# Patient Record
Sex: FEMALE | Race: ASIAN | Employment: FULL TIME | URBAN - METROPOLITAN AREA
[De-identification: names, ages, dates, MRNs, and addresses within clinical notes are randomized per-mention and may not be internally consistent; named-entity substitution may affect disease eponyms.]

---

## 2021-06-29 ENCOUNTER — OFFICE VISIT (OUTPATIENT)
Dept: RHEUMATOLOGY | Facility: CLINIC | Age: 61
End: 2021-06-29
Payer: COMMERCIAL

## 2021-06-29 VITALS
WEIGHT: 146 LBS | DIASTOLIC BLOOD PRESSURE: 90 MMHG | HEIGHT: 61 IN | TEMPERATURE: 99 F | HEART RATE: 80 BPM | SYSTOLIC BLOOD PRESSURE: 135 MMHG | BODY MASS INDEX: 27.56 KG/M2

## 2021-06-29 DIAGNOSIS — M17.11 OSTEOARTHRITIS OF RIGHT KNEE, UNSPECIFIED OSTEOARTHRITIS TYPE: ICD-10-CM

## 2021-06-29 DIAGNOSIS — M53.3 SACROILIAC DYSFUNCTION: ICD-10-CM

## 2021-06-29 DIAGNOSIS — M41.9 SCOLIOSIS, UNSPECIFIED SCOLIOSIS TYPE, UNSPECIFIED SPINAL REGION: ICD-10-CM

## 2021-06-29 DIAGNOSIS — M19.041 PRIMARY OSTEOARTHRITIS OF BOTH HANDS: Primary | ICD-10-CM

## 2021-06-29 DIAGNOSIS — M19.042 PRIMARY OSTEOARTHRITIS OF BOTH HANDS: Primary | ICD-10-CM

## 2021-06-29 DIAGNOSIS — M47.816 OSTEOARTHRITIS OF LUMBAR SPINE, UNSPECIFIED SPINAL OSTEOARTHRITIS COMPLICATION STATUS: ICD-10-CM

## 2021-06-29 DIAGNOSIS — Z79.899 HIGH RISK MEDICATION USE: ICD-10-CM

## 2021-06-29 DIAGNOSIS — M19.90 INFLAMMATORY ARTHRITIS: ICD-10-CM

## 2021-06-29 PROBLEM — M15.9 GENERALIZED OSTEOARTHRITIS: Status: ACTIVE | Noted: 2020-07-12

## 2021-06-29 PROBLEM — I10 BENIGN ESSENTIAL HYPERTENSION: Status: ACTIVE | Noted: 2020-06-25

## 2021-06-29 PROCEDURE — 3008F BODY MASS INDEX DOCD: CPT | Performed by: INTERNAL MEDICINE

## 2021-06-29 PROCEDURE — 3080F DIAST BP >= 90 MM HG: CPT | Performed by: INTERNAL MEDICINE

## 2021-06-29 PROCEDURE — 3075F SYST BP GE 130 - 139MM HG: CPT | Performed by: INTERNAL MEDICINE

## 2021-06-29 PROCEDURE — 99244 OFF/OP CNSLTJ NEW/EST MOD 40: CPT | Performed by: INTERNAL MEDICINE

## 2021-06-29 RX ORDER — DICLOFENAC SODIUM 75 MG/1
TABLET, DELAYED RELEASE ORAL
COMMUNITY

## 2021-06-29 RX ORDER — AMLODIPINE BESYLATE 5 MG/1
5 TABLET ORAL DAILY
COMMUNITY
Start: 2021-02-02

## 2021-06-29 RX ORDER — ERGOCALCIFEROL (VITAMIN D2) 1250 MCG
CAPSULE ORAL
COMMUNITY
Start: 2020-07-09

## 2021-06-29 NOTE — PATIENT INSTRUCTIONS
To schedule physical therapy appointment   [facility]  [phone]    -Get bloodwork. Purchase Voltaren (diclofenac) 1% gel over-the-counter. It is in an orange packaging. Total dose should not exceed 32 g per day, overall affected joints.

## 2021-06-29 NOTE — PROGRESS NOTES
Rheumatology New Patient Note  =====================================================================================================      Date of visit: 6/29/2021  ? Patient presents with:  Establish Care: New pt, referred by PCP for joint paint.  RT h History:  Family History   Problem Relation Age of Onset   • Other (Other) Mother         RA vs OA     Social History:  Social History    Tobacco Use      Smoking status: Never Smoker      Smokeless tobacco: Never Used    Alcohol use: Not Currently    Drug found for: ANATI, ANA MARÍA, ANAS, ANASCRN, ANASCRNRFLX, KATHIE  No results found for: SSA, SSAUR, ANTISSA, SSA52, SSA60, SSADD, SSB, ANTISSB  No results found for: DSDNA, ANTIDSDNA, SMUD, ANTISM, SM, RNP, ANTIRNP, SMITHRNP  No results found for: SCL70, SCL, ANTIS osteitis condensens illi. MRI imaging and x-ray imaging of the LS spine is consistent with lumbar DJD. Patient with a history of scoliosis. Hand arthralgias are consistent with hand osteoarthritis, with an erosive component.   Right MCP 2 narrowing, RE[facility]    Osteoarthritis of right knee, unspecified osteoarthritis type  -     OP REFERRAL TO [facility]    Sacroiliac dysfunction  -     XR SACROILIAC JOINTS (MIN 3 VIEWS) (CPT=72202);  Future        Return in about 4 months (around 10/

## 2021-06-30 ENCOUNTER — TELEPHONE (OUTPATIENT)
Dept: PHYSICAL THERAPY | Facility: HOSPITAL | Age: 61
End: 2021-06-30

## 2021-07-19 ENCOUNTER — APPOINTMENT (OUTPATIENT)
Dept: PHYSICAL THERAPY | Age: 61
End: 2021-07-19
Attending: INTERNAL MEDICINE
Payer: COMMERCIAL

## 2021-07-21 ENCOUNTER — APPOINTMENT (OUTPATIENT)
Dept: PHYSICAL THERAPY | Age: 61
End: 2021-07-21
Attending: INTERNAL MEDICINE
Payer: COMMERCIAL

## 2021-07-27 ENCOUNTER — APPOINTMENT (OUTPATIENT)
Dept: PHYSICAL THERAPY | Age: 61
End: 2021-07-27
Attending: INTERNAL MEDICINE
Payer: COMMERCIAL

## 2021-07-29 ENCOUNTER — APPOINTMENT (OUTPATIENT)
Dept: PHYSICAL THERAPY | Age: 61
End: 2021-07-29
Attending: INTERNAL MEDICINE
Payer: COMMERCIAL

## 2021-08-02 ENCOUNTER — APPOINTMENT (OUTPATIENT)
Dept: PHYSICAL THERAPY | Age: 61
End: 2021-08-02
Attending: INTERNAL MEDICINE
Payer: COMMERCIAL

## 2021-08-04 ENCOUNTER — APPOINTMENT (OUTPATIENT)
Dept: PHYSICAL THERAPY | Age: 61
End: 2021-08-04
Attending: INTERNAL MEDICINE
Payer: COMMERCIAL

## 2021-08-09 ENCOUNTER — APPOINTMENT (OUTPATIENT)
Dept: PHYSICAL THERAPY | Age: 61
End: 2021-08-09
Attending: INTERNAL MEDICINE
Payer: COMMERCIAL

## 2021-08-11 ENCOUNTER — APPOINTMENT (OUTPATIENT)
Dept: PHYSICAL THERAPY | Age: 61
End: 2021-08-11
Attending: INTERNAL MEDICINE
Payer: COMMERCIAL

## 2021-10-10 ENCOUNTER — TELEPHONE (OUTPATIENT)
Dept: RHEUMATOLOGY | Facility: CLINIC | Age: 61
End: 2021-10-10

## 2021-10-10 NOTE — TELEPHONE ENCOUNTER
Please call patient. Did she drink any alcohol prior to getting blood work? ALT slightly elevated. No need to obtain repeat blood work at this time until we see CCP is back. Disregard previous message, labs were found in 6 different faxes. Tried calling patient, went to voicemail.      Received labs collected October 7, 2021    BBC 6.7, hemoglobin 13.7, platelets 400, differential  Sed rate 17  Total cholesterol 266, triglycerides 178, HDL 54, , VLDL 36  Hemoglobin A1c 5.0    CRP 0.9 mg/dL  Creatinine 0.9,   AST 48, , alk phos 55, T bili 0.5  TSH 1.544  Free T4 1.01    RF 15.4 (none less than 14.0 is normal.  CCP pending    Vitamin D 31.2  Vitamin B12 576

## 2021-10-11 ENCOUNTER — TELEPHONE (OUTPATIENT)
Dept: RHEUMATOLOGY | Facility: CLINIC | Age: 61
End: 2021-10-11

## 2021-10-11 DIAGNOSIS — R74.01 ELEVATED ALT MEASUREMENT: Primary | ICD-10-CM

## 2021-10-11 NOTE — TELEPHONE ENCOUNTER
Received labs collected October 7, 2021     BBC 6.7, hemoglobin 13.7, platelets 982, differential  Sed rate 17  Total cholesterol 266, triglycerides 178, HDL 54, , VLDL 36  Hemoglobin A1c 5.0     CRP 0.9 mg/dL  Creatinine 0.9,   AST 48, , a

## 2021-10-12 ENCOUNTER — TELEPHONE (OUTPATIENT)
Dept: RHEUMATOLOGY | Facility: CLINIC | Age: 61
End: 2021-10-12

## 2021-10-12 NOTE — TELEPHONE ENCOUNTER
Pt called back for her test results. She is off work today so can call at anytime. Pt aware that Dr. Anna Emerson is in clinic and will call at his earliest convenience.

## 2021-10-13 NOTE — TELEPHONE ENCOUNTER
Please call patient. Did she drink any alcohol prior to getting blood work? ALT slightly elevated.     Repeat hepatic panel 1 week prior to next appointment. Can fax to patient's lab of choice. Tried calling patient earlier, went to voicemail.      Rec

## 2021-10-21 NOTE — TELEPHONE ENCOUNTER
Pt returned our call; reviewed notes per Dr Rayne Alvarez with pt who verbalized understanding and agreed to this plan. Pt will have labs drawn today. Confirmed appt for Monday 10/25/21 4pm and location.

## 2021-10-25 ENCOUNTER — OFFICE VISIT (OUTPATIENT)
Dept: RHEUMATOLOGY | Facility: CLINIC | Age: 61
End: 2021-10-25
Payer: COMMERCIAL

## 2021-10-25 VITALS
HEART RATE: 93 BPM | BODY MASS INDEX: 27.38 KG/M2 | HEIGHT: 61 IN | DIASTOLIC BLOOD PRESSURE: 86 MMHG | SYSTOLIC BLOOD PRESSURE: 123 MMHG | WEIGHT: 145 LBS | OXYGEN SATURATION: 99 %

## 2021-10-25 DIAGNOSIS — M15.4 EROSIVE OSTEOARTHRITIS OF HAND: Primary | ICD-10-CM

## 2021-10-25 DIAGNOSIS — R74.01 ALT (SGPT) LEVEL RAISED: ICD-10-CM

## 2021-10-25 DIAGNOSIS — Z51.81 THERAPEUTIC DRUG MONITORING: ICD-10-CM

## 2021-10-25 PROCEDURE — 99214 OFFICE O/P EST MOD 30 MIN: CPT | Performed by: INTERNAL MEDICINE

## 2021-10-25 PROCEDURE — 3008F BODY MASS INDEX DOCD: CPT | Performed by: INTERNAL MEDICINE

## 2021-10-25 PROCEDURE — 3074F SYST BP LT 130 MM HG: CPT | Performed by: INTERNAL MEDICINE

## 2021-10-25 PROCEDURE — 3079F DIAST BP 80-89 MM HG: CPT | Performed by: INTERNAL MEDICINE

## 2021-10-25 RX ORDER — MELOXICAM 15 MG/1
15 TABLET ORAL DAILY
Qty: 30 TABLET | Refills: 0 | Status: SHIPPED | OUTPATIENT
Start: 2021-10-25

## 2021-10-25 NOTE — PROGRESS NOTES
Rheumatology f/u Patient Note  =====================================================================================================    Today's Visit: 10/25/21  ? Patient presents with: Follow - Up: 4mo f/u, fingers have gotten worse on both hands.  H as needed. , Disp: , Rfl:   ergocalciferol 1.25 MG (05244 UT) Oral Cap, ergocalciferol (vitamin D2) 1,250 mcg (50,000 unit) capsule   TAKE ONE CAPSULE BY MOUTH ONCE A WEEK, Disp: , Rfl:   Multiple Vitamin (MULTI-VITAMIN DAILY OR), Multi Vitamin, Disp: , Rfl BUNCREA, CREATSERUM, ANIONGAP, GFR, GFRNAA, GFRAA, CA, OSMOCALC, ALKPHO, AST, ALT, ALKPHOS, BILT, TP, ALB, GLOBULT, GLOBULIN, AGRATIO, ALBGLOBRAT, NA, K, CL, CO2      No results found for: ANATI, ANA MARÍA, ANAS, ANASCRN, ANASCRNRFLX, KATHIE  No results found for: protrusion    =====================================================================================================  Assessment and Plan    Assessment:  Erosive osteoarthritis of hand  (primary encounter diagnosis)  ALT (SGPT) level raised  Therapeutic bautista total) by mouth daily.  -     OP REFERRAL TO [facility]    ALT (SGPT) level raised  -     COMP METABOLIC PANEL (14); Future    Therapeutic drug monitoring        No follow-ups on file.       The above plan of care, diagnosis, orders, and fo

## 2021-10-25 NOTE — PATIENT INSTRUCTIONS
Stop diclofenac, start meloxicam 15 mg daily with food for 1 week, if meloxicam is better, continue that. If not, stop meloxicam and go back to diclofenac. Turmeric for arthritis: take 1000 mg twice daily.

## 2021-11-09 ENCOUNTER — TELEPHONE (OUTPATIENT)
Dept: RHEUMATOLOGY | Facility: CLINIC | Age: 61
End: 2021-11-09

## 2021-11-09 NOTE — TELEPHONE ENCOUNTER
Can you see how patient's hand and knee OA is doing with mobic? Also, did pennsaid sample help? If it did, can put in formal rx.

## 2021-11-09 NOTE — TELEPHONE ENCOUNTER
Called pt, pt condition improved since starting the meloxicam. Pain decreased, has not had to use pennsaid sample. Denies refill at this time. Will call our office with questions or concerns.

## (undated) NOTE — LETTER
10/25/2021        Referring:  No referring provider defined for this encounter. Dear Dr. Jun Tang:    Thank you for referring your patient to me for an evaluation. Please see my attached note for my findings and recommendations.  Should you have any questi finger. No gout flares, no kidney stones. Takes turmeric 400 mg BID   Back is better.      5 point ROS negative except noted above    Medications:  Misc Natural Products (APPLE CIDER VINEGAR DIET OR), apple cider vinegar, Disp: , Rfl:   Turmeric (QC COSME or rashes. MSK: 28 joint count performed. No evidence of synovitis in mcp, pip, dip, wrist, elbows, shoulders, hips, knees, ankles, mtp unless otherwise noted. Full ROM of elbows, wrists, knees.      Significant Heberden's nodes at DIPs with synovial cyst 2021  Impression  1.   Mild levo convex scoliosis across the thoracolumbar spine with slight bowing/bulging of discs at L1-2 and L2-3.  2.  L3-4, disc bulge towards the set left of the canal, right neuroforaminal anterolisthesis-like endplate offset with ov improvement in pain.   Repeat CMP after being on this to see if AST/ALT is improving; if still elevated, consider hep b/c or liver US testing in the future    -increase turmeric to 1000 mg BID   -cont voltaren 1% topical for hand/knee OA, trial pennsaid 2%

## (undated) NOTE — LETTER
6/29/2021        Referring:  Nabila Hines MD  [address]      Dear Dr. Jun Tang:    Thank you for referring your patient to me for an evaluation. Please see my attached note for my findings and recommendations.  Should you ha Take 1 tablet twice a day by oral route as needed. , Disp: , Rfl:   ergocalciferol 1.25 MG (53213 UT) Oral Cap, ergocalciferol (vitamin D2) 1,250 mcg (50,000 unit) capsule   TAKE ONE CAPSULE BY MOUTH ONCE A WEEK, Disp: , Rfl:   Multiple Vitamin (MULTI-VITAM Right knee OA changes  Significant Heberden's nodes at DIPs with synovial cyst no swelling. No MCP/PIP swelling/tenderness      ?   Labs:  No results found for: WBC, RBC, HGB, HCT, PLT, MPV, MCV, MCH, MCHC, RDW, NEPRELIM, NEUTABS, LYMPHABS, EOSABS, BASABS, spinal region  (primary encounter diagnosis)  Primary osteoarthritis of both hands  Osteoarthritis of lumbar spine, unspecified spinal osteoarthritis complication status  High risk medication use  Inflammatory arthritis  Osteoarthritis of right knee, unspe VITAMIN B-12; Future  -     TSH W REFLEX TO FREE T4; Future  -     CBC WITH DIFFERENTIAL WITH PLATELET; Future  -     RHEUMATOID ARTHRITIS FACTOR; Future  -     CYCLIC CITRULLINATE PEP.  IGG; Future  -     VITAMIN D, 25-HYDROXY; Future  -     COMP METABOLIC